# Patient Record
Sex: FEMALE | Race: WHITE | NOT HISPANIC OR LATINO | Employment: FULL TIME | URBAN - METROPOLITAN AREA
[De-identification: names, ages, dates, MRNs, and addresses within clinical notes are randomized per-mention and may not be internally consistent; named-entity substitution may affect disease eponyms.]

---

## 2021-03-01 ENCOUNTER — EVALUATION (OUTPATIENT)
Dept: PHYSICAL THERAPY | Facility: CLINIC | Age: 56
End: 2021-03-01
Payer: COMMERCIAL

## 2021-03-01 ENCOUNTER — APPOINTMENT (EMERGENCY)
Dept: RADIOLOGY | Facility: HOSPITAL | Age: 56
End: 2021-03-01
Payer: COMMERCIAL

## 2021-03-01 ENCOUNTER — HOSPITAL ENCOUNTER (EMERGENCY)
Facility: HOSPITAL | Age: 56
Discharge: HOME | End: 2021-03-01
Attending: EMERGENCY MEDICINE | Admitting: EMERGENCY MEDICINE
Payer: COMMERCIAL

## 2021-03-01 ENCOUNTER — OFFICE VISIT (OUTPATIENT)
Dept: URGENT CARE | Facility: CLINIC | Age: 56
End: 2021-03-01
Payer: COMMERCIAL

## 2021-03-01 ENCOUNTER — APPOINTMENT (OUTPATIENT)
Dept: RADIOLOGY | Facility: CLINIC | Age: 56
End: 2021-03-01
Payer: COMMERCIAL

## 2021-03-01 VITALS
WEIGHT: 185 LBS | HEIGHT: 63 IN | DIASTOLIC BLOOD PRESSURE: 84 MMHG | BODY MASS INDEX: 32.78 KG/M2 | TEMPERATURE: 97.5 F | SYSTOLIC BLOOD PRESSURE: 149 MMHG | OXYGEN SATURATION: 98 % | RESPIRATION RATE: 18 BRPM | HEART RATE: 115 BPM

## 2021-03-01 VITALS
DIASTOLIC BLOOD PRESSURE: 61 MMHG | HEART RATE: 85 BPM | HEIGHT: 63 IN | WEIGHT: 182.98 LBS | TEMPERATURE: 99.6 F | SYSTOLIC BLOOD PRESSURE: 124 MMHG | OXYGEN SATURATION: 92 % | BODY MASS INDEX: 32.42 KG/M2 | RESPIRATION RATE: 21 BRPM

## 2021-03-01 DIAGNOSIS — S73.005A HIP DISLOCATION, LEFT, INITIAL ENCOUNTER (HCC): Primary | ICD-10-CM

## 2021-03-01 DIAGNOSIS — M25.552 LEFT HIP PAIN: ICD-10-CM

## 2021-03-01 DIAGNOSIS — M25.552 LEFT HIP PAIN: Primary | ICD-10-CM

## 2021-03-01 DIAGNOSIS — T84.021A DISLOCATION OF INTERNAL LEFT HIP PROSTHESIS, INITIAL ENCOUNTER (HCC): ICD-10-CM

## 2021-03-01 DIAGNOSIS — Z96.642 STATUS POST TOTAL REPLACEMENT OF LEFT HIP: ICD-10-CM

## 2021-03-01 PROCEDURE — 99284 EMERGENCY DEPT VISIT MOD MDM: CPT

## 2021-03-01 PROCEDURE — 73501 X-RAY EXAM HIP UNI 1 VIEW: CPT

## 2021-03-01 PROCEDURE — 73502 X-RAY EXAM HIP UNI 2-3 VIEWS: CPT

## 2021-03-01 PROCEDURE — 99152 MOD SED SAME PHYS/QHP 5/>YRS: CPT | Performed by: EMERGENCY MEDICINE

## 2021-03-01 PROCEDURE — G0382 LEV 3 HOSP TYPE B ED VISIT: HCPCS | Performed by: EMERGENCY MEDICINE

## 2021-03-01 PROCEDURE — 96360 HYDRATION IV INFUSION INIT: CPT

## 2021-03-01 PROCEDURE — 27265 TREAT HIP DISLOCATION: CPT | Performed by: EMERGENCY MEDICINE

## 2021-03-01 PROCEDURE — 99285 EMERGENCY DEPT VISIT HI MDM: CPT | Performed by: EMERGENCY MEDICINE

## 2021-03-01 PROCEDURE — 97161 PT EVAL LOW COMPLEX 20 MIN: CPT | Performed by: PHYSICAL THERAPIST

## 2021-03-01 PROCEDURE — S9083 URGENT CARE CENTER GLOBAL: HCPCS | Performed by: EMERGENCY MEDICINE

## 2021-03-01 RX ORDER — CYCLOBENZAPRINE HCL 10 MG
10 TABLET ORAL 3 TIMES DAILY PRN
Qty: 20 TABLET | Refills: 0 | Status: SHIPPED | OUTPATIENT
Start: 2021-03-01

## 2021-03-01 RX ORDER — PROPOFOL 10 MG/ML
40 INJECTION, EMULSION INTRAVENOUS ONCE
Status: COMPLETED | OUTPATIENT
Start: 2021-03-01 | End: 2021-03-01

## 2021-03-01 RX ORDER — PREDNISONE 10 MG/1
TABLET ORAL
Qty: 27 TABLET | Refills: 0 | Status: SHIPPED | OUTPATIENT
Start: 2021-03-01

## 2021-03-01 RX ORDER — PROPOFOL 10 MG/ML
60 INJECTION, EMULSION INTRAVENOUS ONCE
Status: COMPLETED | OUTPATIENT
Start: 2021-03-01 | End: 2021-03-01

## 2021-03-01 RX ORDER — SODIUM CHLORIDE 9 MG/ML
125 INJECTION, SOLUTION INTRAVENOUS CONTINUOUS
Status: DISCONTINUED | OUTPATIENT
Start: 2021-03-01 | End: 2021-03-01 | Stop reason: HOSPADM

## 2021-03-01 RX ORDER — PROPOFOL 10 MG/ML
50 INJECTION, EMULSION INTRAVENOUS ONCE
Status: COMPLETED | OUTPATIENT
Start: 2021-03-01 | End: 2021-03-01

## 2021-03-01 RX ADMIN — PROPOFOL 50 MG: 10 INJECTION, EMULSION INTRAVENOUS at 13:23

## 2021-03-01 RX ADMIN — SODIUM CHLORIDE 125 ML/HR: 0.9 INJECTION, SOLUTION INTRAVENOUS at 13:07

## 2021-03-01 RX ADMIN — PROPOFOL 40 MG: 10 INJECTION, EMULSION INTRAVENOUS at 13:20

## 2021-03-01 RX ADMIN — PROPOFOL 60 MG: 10 INJECTION, EMULSION INTRAVENOUS at 13:16

## 2021-03-01 RX ADMIN — PROPOFOL 60 MG: 10 INJECTION, EMULSION INTRAVENOUS at 13:30

## 2021-03-01 NOTE — Clinical Note
Reno Cohen was seen and treated in our emergency department on 3/1/2021  No work until cleared by Family Doctor/Orthopedics        Diagnosis:     Maxi Gastelum    She may return on this date: If you have any questions or concerns, please don't hesitate to call        Christine Sandra DO    ______________________________           _______________          _______________  Hospital Representative                              Date                                Time

## 2021-03-01 NOTE — PROGRESS NOTES
[address]        NAME: Abrahan Albert is a 60 y o  female  : 1965    MRN: [id number]  DATE: 2021  TIME: 12:30 PM    Assessment and Plan   [facility] [[id number]]  1  Hip dislocation, left, initial encounter [facility])  Ambulatory referral to Physical Therapy    cyclobenzaprine (FLEXERIL) 10 mg tablet    predniSONE 10 mg tablet    XR hip/pelv 1 vw left if performed    Ambulatory Referral to Emergency Medicine    Transfer to other facility     Patient went to physical therapy as instructed however therapist was concerned about the degree of pain she was having and the limitation of motion that she was having therefore she advised x-ray of hip before she progressed with further maneuvers  X-ray revealed dislocated hip prosthesis on the left  I advised patient that further treatment would be needed in an ER  She had the left total hip replacement surgery done at [facility] but would prefer treatment at [facility] if possible  I discussed the case with ER doctor at [address] [place] [address] who agrees that patient could likely be treated there and would call it in ortho if she was unable to reduce the dislocation  Patient Instructions     Patient Instructions     Hip Dislocation   WHAT YOU NEED TO KNOW:   A hip dislocation occurs when your thigh bone is forced out of your hip socket  DISCHARGE INSTRUCTIONS:   Return to the emergency department if:   · You have severe pain  · You dislocate your hip again  Call your doctor if:   · You have a fever  · You have pain that does not go away after you take pain medicine  · You cannot walk well with your cane or crutches  · You have questions or concerns about your condition or care  Medicines: You may need the following:  · Prescription pain medicine  may be given  Ask your healthcare provider how to take this medicine safely   Some prescription pain medicines contain acetaminophen  Do not take other medicines that contain acetaminophen without talking to your healthcare provider  Too much acetaminophen may cause liver damage  Prescription pain medicine may cause constipation  Ask your healthcare provider how to prevent or treat constipation  · Take your medicine as directed  Contact your healthcare provider if you think your medicine is not helping or if you have side effects  Tell him of her if you are allergic to any medicine  Keep a list of the medicines, vitamins, and herbs you take  Include the amounts, and when and why you take them  Bring the list or the pill bottles to follow-up visits  Carry your medicine list with you in case of an emergency  Manage a hip dislocation: It will take 2 to 3 months for your hip to heal   · Use a walker or crutches as directed  Ask your healthcare provider or orthopedist when you can put weight on your injured side  As your hip heals, use a cane to help you walk until your limp goes away  · Avoid high-impact activities and sports  Do this for 6 to 12 weeks or until your hip strength has returned  · Go to physical therapy, if directed  A physical therapist teaches you exercises to increase the range of motion in your hip  Exercises also make your hip stronger and decrease pain  Prevent another hip dislocation:  Follow these precautions for 6 weeks after your injury or as directed:  · Sit with your back straight and your feet flat on the floor  Do not cross your legs  Do not lean forward when you sit in a chair  · Keep your knees apart  Place a pillow or wedge between your knees when you sit or lie  Do not twist your knees  Do not lift your knees higher than your hips  · Do not sit in a low chair  Use armrests and your upper body strength to push yourself up from a sitting position  · Do not bend at the waist to  an object from the floor    Bend your knees to reach the object, or use a tool to pick it up  Follow up with your doctor or orthopedist as directed: You may need another x-ray or CT scan  Write down your questions so you remember to ask them during your visits  © Copyright [address] Information is for End User's use only and may not be sold, redistributed or otherwise used for commercial purposes  All illustrations and images included in CareNotes® are the copyrighted property of A D A M , Inc  or [employer]   The above information is an  only  It is not intended as medical advice for individual conditions or treatments  Talk to your doctor, nurse or pharmacist before following any medical regimen to see if it is safe and effective for you  Hip Pain   WHAT YOU NEED TO KNOW:   Hip pain can be caused by a number of conditions, such as bursitis, arthritis, or muscle or tendon strain  You may have swelling in the fluid-filled sacs that protect your muscles and tendons  Hip pain can also be caused by a lower back problem  Hip pain may be caused by trauma, playing sports, or running  Pain may start in your hip and go to your thigh, buttock, or groin  DISCHARGE INSTRUCTIONS:   Medicines:   · NSAIDs , such as ibuprofen, help decrease swelling, pain, and fever  This medicine is available with or without a doctor's order  NSAIDs can cause stomach bleeding or kidney problems in certain people  If you take blood thinner medicine, always ask your healthcare provider if NSAIDs are safe for you  Always read the medicine label and follow directions  · Take your medicine as directed  Contact your healthcare provider if you think your medicine is not helping or if you have side effects  Tell him of her if you are allergic to any medicine  Keep a list of the medicines, vitamins, and herbs you take  Include the amounts, and when and why you take them  Bring the list or the pill bottles to follow-up visits   Carry your medicine list with you in case of an emergency  Return to the emergency department if:   · Your pain gets worse  · You have numbness in your leg or toes  · You cannot put any weight on or move your hip  Contact your healthcare provider if:   · You have a fever  · Your pain does not decrease, even after treatment  · You have questions or concerns about your condition or care  Follow up with your healthcare provider as directed: You may need physical therapy, an injection, or more testing  You may need to see an orthopedic specialist  Write down your questions so you remember to ask them during your visits  Manage your hip pain:   · Rest  your injured hip so that it can heal  You may need to avoid putting any weight on your hip for at least 48 hours  Return to normal activities as directed  · Ice  the injury for 20 minutes every 4 hours, or as directed  Use an ice pack, or put crushed ice in a plastic bag  Cover it with a towel to protect your skin  Ice helps prevent tissue damage and decreases swelling and pain  · Elevate  your injured hip above the level of your heart as often as you can  This will help decrease swelling and pain  If possible, prop your hip and leg on pillows or blankets to keep the area elevated comfortably  · Maintain a healthy weight  Extra body weight can cause pressure and pain in your hip, knee, and ankle joints  Ask your healthcare provider how much you should weigh  Ask him or her to help you create a weight loss plan if you are overweight  · Use assistive devices as directed  You may need to use a cane or crutches  Assistive devices help decrease pain and pressure on your hip when you walk  Ask your healthcare provider for more information about assistive devices and how to use them correctly  © Copyright [address] Information is for End User's use only and may not be sold, redistributed or otherwise used for commercial purposes   All illustrations and images included in CareNotes® are the copyrighted property of A D A M , Inc  or [facility] [employer]   The above information is an  only  It is not intended as medical advice for individual conditions or treatments  Talk to your doctor, nurse or pharmacist before following any medical regimen to see if it is safe and effective for you  Piriformis Syndrome   WHAT YOU NEED TO KNOW:   Piriformis syndrome is sciatic nerve pain caused by an injured or overused piriformis muscle  This is a muscle inside your buttocks that helps you move your leg  The pain is caused when this muscle pinches your sciatic nerve  You may feel the pain in your hip or down your leg  DISCHARGE INSTRUCTIONS:   Medicines: You may need any of the following:  · Prescription medicines  may be used to relax your muscles and decrease pain and swelling  · NSAIDs  help decrease swelling and pain  This medicine is available with or without a doctor's order  NSAIDs can cause stomach bleeding or kidney problems in certain people  If you take blood thinner medicine, always ask your healthcare provider if NSAIDs are safe for you  Always read the medicine label and follow directions  · Acetaminophen  decreases pain  It is available without a doctor's order  Ask how much to take and how often to take it  Follow directions  Acetaminophen can cause liver damage if not taken correctly  · Take your medicine as directed  Contact your healthcare provider if you think your medicine is not helping or if you have side effects  Tell him or her if you are allergic to any medicine  Keep a list of the medicines, vitamins, and herbs you take  Include the amounts, and when and why you take them  Bring the list or the pill bottles to follow-up visits  Carry your medicine list with you in case of an emergency  Follow up with your healthcare provider as directed: You may need to return for more tests   You may also be referred to a physical therapist  Write down your questions so you remember to ask them during your visits  Manage your symptoms of piriformis syndrome:   · Rest as directed  Avoid activities that make your pain worse  · Apply ice to the buttock on your injured side  Use an ice pack, or put crushed ice in a plastic bag  Cover it with a towel  Leave the ice on for 15 to 20 minutes every hour, or as directed  Ice helps prevent tissue damage and decreases swelling and pain  · Apply heat to the buttock on your injured side  Use heating pads for 20 to 30 minutes every 2 hours for as many days as directed  Heat helps decrease pain and muscle spasms  · Stretch as directed  Lie on your back with your knees bent  Place the ankle of your injured leg on the knee of your other leg  Gently pull your bent leg toward your chest, until you feel a stretch in the buttock of your injured leg  A physical therapist may show you other exercises to stretch and strengthen your hip muscles  Contact your healthcare provider if:   · Your pain worsens or returns, even with treatment  · You have questions or concerns about your condition or care  Return to the emergency department if:   · You cannot move your leg or foot  © Copyright [address] Information is for End User's use only and may not be sold, redistributed or otherwise used for commercial purposes  All illustrations and images included in CareNotes® are the copyrighted property of A D A M , Inc  or [address] [place] [address]  The above information is an  only  It is not intended as medical advice for individual conditions or treatments  Talk to your doctor, nurse or pharmacist before following any medical regimen to see if it is safe and effective for you  Follow up with PCP in 3-5 days  Proceed to  ER if symptoms worsen      Chief Complaint     Chief Complaint   Patient presents with    thigh pain     pain and tightness in buttocks and thigh on left side, symptoms started this morning after bending down and reaching over to left side, pt states had hip replacemnet in 2011          History of Present Illness       Patient complains sudden onset of left hip pain radiating to left thigh while bending down to pick something up earlier today  She had total hip replacement on the left 10 years ago without complications  She noted increasing left hip pain and tightness over the past few months and she saw her orthopedic surgeon for this problem 5 weeks ago  He did an x-ray and told her that x-ray showed no new problems and he gave her a "steroid injection" into her left hip followed by partial symptomatic relief  Review of Systems   Review of Systems   Constitutional: Negative for activity change  Gastrointestinal: Negative for abdominal pain  Musculoskeletal: Positive for arthralgias  Negative for back pain, joint swelling, myalgias, neck pain and neck stiffness  Skin: Negative for color change and wound  Neurological: Negative for dizziness, syncope, weakness, light-headedness and headaches           Current Medications       Current Outpatient Medications:     cyclobenzaprine (FLEXERIL) 10 mg tablet, Take 1 tablet (10 mg total) by mouth 3 (three) times a day as needed for muscle spasms, Disp: 20 tablet, Rfl: 0    predniSONE 10 mg tablet, Take once daily all days pills on this schedule 6- 6- 5- 4- 3- 2- 1, Disp: 27 tablet, Rfl: 0    Current Allergies     Allergies as of 03/01/2021 - Reviewed 03/01/2021   Allergen Reaction Noted    Bactrim [sulfamethoxazole-trimethoprim] Hives 03/01/2021            The following portions of the patient's history were reviewed and updated as appropriate: allergies, current medications, past family history, past medical history, past social history, past surgical history and problem list      Past Medical History:   Diagnosis Date    Known health problems: none        Past Surgical History:   Procedure Laterality Date    APPENDECTOMY       SECTION      TOTAL HIP ARTHROPLASTY         History reviewed  No pertinent family history  Medications have been verified  Objective   /84   Pulse (!) 115   Temp 97 5 °F (36 4 °C)   Resp 18   Ht 5' 3" (1 6 m)   Wt 83 9 kg (185 lb)   SpO2 98%   BMI 32 77 kg/m²        Physical Exam     Physical Exam  Vitals signs and nursing note reviewed  Constitutional:       Appearance: She is well-developed  HENT:      Head: Normocephalic and atraumatic  Eyes:      Conjunctiva/sclera: Conjunctivae normal       Pupils: Pupils are equal, round, and reactive to light  Neck:      Musculoskeletal: Normal range of motion and neck supple  Musculoskeletal:         General: Tenderness present  Comments: Tender left buttocks at piriformis muscle and trochanteric bursa  Patient had extreme difficulty getting onto same table therefore she was not put through range of motion testing for her hip  Skin:     General: Skin is warm and dry  Findings: No rash  Neurological:      Mental Status: She is alert and oriented to person, place, and time  Deep Tendon Reflexes: Reflexes normal    Psychiatric:         Behavior: Behavior normal          Thought Content:  Thought content normal          Judgment: Judgment normal

## 2021-03-01 NOTE — PROGRESS NOTES
PT Evaluation     Today's date: 3/1/2021  Patient name: Demian Sauer  : 1965  MRN: [id number]  Referring provider: Peace Rosales MD  Dx:   Encounter Diagnosis     ICD-10-CM    1  Left hip pain  M25 552                   Assessment  Assessment details: Demian Sauer is a 60 y o  female who presents with complaints of acute L hip pain  Further referral to obtain an X-ray appears necessary at this time based upon examination results  Pt does have a history of L PAVITHRA in   She states that she reached across her body (to the L) to pick something off the ground this morning and has immediate pain in her groin and posterior hip  She was unable to put full weight through her L side and was ambulating with a rolling walker  Due to AVIS and current presentation, PT discontinued evaluation due to suspicion of dislocation of L hip  Pt was referred to Urgent care to obtain an x-ray  Impairments: abnormal gait, activity intolerance, impaired physical strength, lacks appropriate home exercise program, pain with function and weight-bearing intolerance    Symptom irritability: moderate  Plan  Plan details: Pt referred for further imaging of L hip at this time  Treatment plan discussed with: patient        Subjective Evaluation    History of Present Illness  Mechanism of injury: Pt had PAVITHRA in  and went to therapy following that  She reports no difficulty or pain following for the last 10 years  She notes that ~ 3 months ago she started to get pain in her L hip and into her buttocks out of the blue  She notes that she went back to her surgeon in December, took x-rays and everything looked good  She did get a shot that provided relief  Pt saw the urgent care this morning, they referred her to PT to see if that would help  Did not do x-rays this morning  She states that reaching down to pick something up or going across her body to the L causes tightness in her groin and lateral L hip   Pt notes that she does not feel like her hip is out of place, but its very tight  Pt notes that she did have tingling into her toes on her L side this morning  Pt is ambulating with a walking right now due to pain, she states that she usually does not use anything to ambulate  She cannot put full pressure on her L side currently and can barely more her L leg  Pt does clerical work, sitting for most of the day  Quality of life: excellent    Pain  Current pain ratin  At best pain ratin  At worst pain ratin  Location: L groin area and lateral hip, buttock area on L  Quality: tight and pulling  Relieving factors: ice  Aggravating factors: standing, walking and stair climbing    Social Support  Steps to enter house: yes (1 step)  Lives in: [facility]      Diagnostic Tests  X-ray: normal  Patient Goals  Patient goals for therapy: decreased pain, increased motion, return to work and independence with ADLs/IADLs          Objective     Neurological Testing     Sensation     Hip   Left Hip   Intact: light touch    Right Hip   Intact: light touch    General Comments:      Hip Comments   Unable to actively flex, abd, add  L hip  Minimal motion in internal rotation/external rotation  Pain with all motions  Minimal strength in L hip, pain in all planes  Pt was not able to lay supine on examination table due to pain           Precautions: H/O PAVITHRA      Daily Treatment Diary:      Initial Evaluation Date: 21  Compliance 3/1/21                     Visit Number 1                    Reyna-Ahsan  IE                 [address] [place]                           3/1/21                     Manual                      Piriformis STM                      L hip ROM                                            Ther-Ex Neuro Re-Ed                                                                                                Ther-Act                                                               Modalities

## 2021-03-01 NOTE — ED PROVIDER NOTES
History  Chief Complaint   Patient presents with    Dislocation     pt presented to this ED per left prosthetic hip dislocation after bending over this morning at 0700 and felt a "pop"  Patient is a 12-year-old female presenting to the emergency department from urgent care secondary to left hip prosthetic dislocation, patient reports around 7:00 a m  This morning she was twisting to the left side and felt a pop, since then she has had pain in the left hip and has been unable to bear weight, she had x-rays at urgent care and was promptly sent to the emergency department after hip dislocation was noted, she denies pain or injury elsewhere, she had the hip replacement 10 years ago, has not had a previous dislocation, she has however been having some discomfort in the left hip over the past few months, denies any numbness or tingling          Prior to Admission Medications   Prescriptions Last Dose Informant Patient Reported? Taking? cyclobenzaprine (FLEXERIL) 10 mg tablet   No No   Sig: Take 1 tablet (10 mg total) by mouth 3 (three) times a day as needed for muscle spasms   predniSONE 10 mg tablet   No No   Sig: Take once daily all days pills on this schedule 6- 6- 5- 4- 3- 2- 1      Facility-Administered Medications: None       Past Medical History:   Diagnosis Date    Known health problems: none        Past Surgical History:   Procedure Laterality Date    APPENDECTOMY       SECTION      TOTAL HIP ARTHROPLASTY         History reviewed  No pertinent family history  I have reviewed and agree with the history as documented  E-Cigarette/Vaping    E-Cigarette Use Never User      E-Cigarette/Vaping Substances     Social History     Tobacco Use    Smoking status: Never Smoker    Smokeless tobacco: Never Used   Substance Use Topics    Alcohol use: Yes     Frequency: Monthly or less    Drug use: Never       Review of Systems   Constitutional: Negative  HENT: Negative  Eyes: Negative  Respiratory: Negative  Cardiovascular: Negative  Gastrointestinal: Negative  Endocrine: Negative  Genitourinary: Negative  Musculoskeletal: Positive for arthralgias (Left hip pain)  Skin: Negative  Allergic/Immunologic: Negative  Neurological: Negative  Hematological: Negative  Psychiatric/Behavioral: Negative  Physical Exam  Physical Exam  Constitutional:       Appearance: She is well-developed  HENT:      Head: Normocephalic and atraumatic  Eyes:      Conjunctiva/sclera: Conjunctivae normal       Pupils: Pupils are equal, round, and reactive to light  Neck:      Musculoskeletal: Normal range of motion and neck supple  Cardiovascular:      Rate and Rhythm: Normal rate  Pulmonary:      Effort: Pulmonary effort is normal    Abdominal:      Palpations: Abdomen is soft  Musculoskeletal:      Comments: Tenderness to palpate over the left hip joints, decreased range of motion, distal sensation and motor is intact with 2+ DP pulses   Skin:     General: Skin is warm and dry  Neurological:      Mental Status: She is alert and oriented to person, place, and time           Vital Signs  ED Triage Vitals [03/01/21 1301]   Temperature Pulse Respirations Blood Pressure SpO2   99 6 °F (37 6 °C) (!) 122 18 157/94 99 %      Temp src Heart Rate Source Patient Position - Orthostatic VS BP Location FiO2 (%)   -- Monitor Lying Left arm --      Pain Score       7           Vitals:    03/01/21 1345 03/01/21 1350 03/01/21 1355 03/01/21 1400   BP: 116/57 126/62 125/58 124/61   Pulse: 86 90 91 85   Patient Position - Orthostatic VS: Lying Lying Lying Lying               ED Medications  Medications   sodium chloride 0 9 % infusion (0 mL/hr Intravenous Stopped 3/1/21 1425)   propofol (DIPRIVAN) 200 MG/20ML bolus injection 60 mg (60 mg Intravenous Given 3/1/21 1316)   propofol (DIPRIVAN) 200 MG/20ML bolus injection 40 mg (40 mg Intravenous Given 3/1/21 1320)   propofol (DIPRIVAN) 200 MG/20ML bolus injection 50 mg (60 mg Intravenous Given 3/1/21 1330)   propofol (DIPRIVAN) 200 MG/20ML bolus injection 50 mg (50 mg Intravenous Given 3/1/21 1323)       Diagnostic Studies  Results Reviewed     None                 XR hip/pelv 2-3 vws left if performed   Final Result by Henry Arevalo MD (03/01 1349)   Anatomic relocation of prosthetic left hip      No acute osseous abnormality              Workstation performed: [id number]         XR hip/pelv 1 vw left if performed   ED Interpretation by Alex Obrien DO (03/01 1345)   Successful reduction      Final Result by Henry Arevalo MD (03/01 1347)      Persistent superior dislocation of prosthetic left femoral head            Workstation performed: [id number]                    Procedures  Procedural Sedation    Date/Time: 3/1/2021 1:39 PM  Performed by: Alex Obrien DO  Authorized by: Alex Obrien DO     Immediate pre-procedure details:     Reassessment: Patient reassessed immediately prior to procedure      Reviewed: vital signs, relevant labs/tests and NPO status      Verified: bag valve mask available, emergency equipment available, intubation equipment available, IV patency confirmed, oxygen available, reversal medications available and suction available    Procedure details (see MAR for exact dosages):     Sedation start time:  3/1/2021 1:16 PM    Preoxygenation:  Nasal cannula    Sedation:  Propofol    Intra-procedure monitoring:  Blood pressure monitoring, cardiac monitor, continuous pulse oximetry, frequent LOC assessments and frequent vital sign checks    Intra-procedure events: none      Intra-procedure management:  Supplemental oxygen    Sedation end time:  3/1/2021 1:46 PM    Total sedation time (minutes):  30  Post-procedure details:     Post-sedation assessment completed:  3/1/2021 1:41 PM    Attendance: Constant attendance by certified staff until patient recovered      Recovery: Patient returned to pre-procedure baseline Post-sedation assessments completed and reviewed: airway patency, cardiovascular function, hydration status, mental status, nausea/vomiting, pain level, respiratory function and temperature      Patient is stable for discharge or admission: yes      Patient tolerance: Tolerated well, no immediate complications  Pre-Procedural Sedation  Performed by: Bandar Bolanos DO  Authorized by: Bandar Bolanos DO     Consent:     Consent obtained:  Written    Consent given by:  Patient    Risks discussed: Allergic reaction, dysrhythmia, inadequate sedation, nausea, prolonged hypoxia resulting in organ damage, prolonged sedation necessitating reversal, respiratory compromise necessitating ventilatory assistance and intubation and vomiting    Alternatives discussed:  Analgesia without sedation, anxiolysis and regional anesthesia  Brohard protocol:     Procedure explained and questions answered to patient or proxy's satisfaction: yes      Relevant documents present and verified: yes      Test results available and properly labeled: yes      Radiology Images displayed and confirmed    If images not available, report reviewed: yes      Required blood products, implants, devices, and special equipment available: yes      Site/side marked: yes      Immediately prior to procedure a time out was called: yes      Patient identity confirmation method:  Verbally with patient and arm band  Indications:     Sedation purpose:  Dislocation reduction    Procedure necessitating sedation performed by:  Different physician    Intended level of sedation:  Moderate (conscious sedation)  Pre-sedation assessment:     NPO status caution: urgency dictates proceeding with non-ideal NPO status      Neck mobility: normal      Mouth opening:  3 or more finger widths    Thyromental distance:  3 finger widths    Mallampati score:  I - soft palate, uvula, fauces, pillars visible    Pre-sedation assessments completed and reviewed: airway patency, cardiovascular function, hydration status, mental status, nausea/vomiting, pain level, respiratory function and temperature      History of difficult intubation: no      Pre-sedation assessment completed:  3/1/2021 1:10 PM  Orthopedic injury treatment    Date/Time: 3/1/2021 1:41 PM  Performed by: Paris Ballard DO  Authorized by: Paris Ballard DO     Patient Location:  ED  Other Assisting Provider: Yes (comment) (Dr Jorge Akbar and Dr Paradise Carlos)    Written consent obtained?: Yes    Risks and benefits: Risks, benefits and alternatives were discussed    Consent given by:  Patient  Patient states understanding of procedure being performed: Yes    Patient's understanding of procedure matches consent: Yes    Procedure consent matches procedure scheduled: Yes    Relevant documents present and verified: Yes    Test results available and properly labeled: Yes    Site marked: Yes    Radiology Images displayed and confirmed  If images not available, report reviewed: Yes    Required items: Required blood products, implants, devices and special equipment available    Patient identity confirmed:  Verbally with patient and arm band  Time out: Immediately prior to the procedure a time out was called    Injury location:  Hip  Location details:  Left hip  Injury type:  Dislocation  Dislocation type: posterior    Spontaneous?: Yes    Prosthesis?: Yes    Neurovascular status: Neurovascularly intact    Distal perfusion: normal    Neurological function: normal    Range of motion: normal    Local anesthesia used?: No    General anesthesia used?: No    Sedation type:   Moderate (conscious) sedation (See separate Procedural Sedation form)  Manipulation performed?: Yes    Reduction method:  Traction and counter traction  Reduction method:  Traction and counter traction  Reduction method:  Traction and counter traction  Reduction method:  Traction and counter traction  Reduction method:  Traction and counter traction  Reduction method: Traction and counter traction  Skeletal traction used?: Yes    Reduction successful?: Yes    Confirmation: Reduction confirmed by x-ray    Immobilization:  Knee immobilizer (static)  Neurovascular status: Neurovascularly intact    Distal perfusion: normal    Neurological function: normal    Range of motion: normal    Patient tolerance:  Patient tolerated the procedure well with no immediate complications      Conscious Sedation Assessment      Classification Score   ASA Scale Assessment  1-Healthy patient, no disease outside surgical process filed at 03/01/2021 1314             ED Course  ED Course as of Mar 01 1455   Mon Mar 01, 2021   8624 Pre and post dislocation x-rays reviewed and provided to on-call orthopedist, Dr Nohelia Welsh, but recommends patient be placed in a long knee immobilizer, given hip dislocation precautions and follow-up in the office as an outpatient within the next week, plan was discussed with patient, she was placed in a knee immobilizer, provided with hip dislocation precautions, advised to take Tylenol or Motrin as needed and follow-up with orthopedics within the next week, she was provided with contact information for local orthopedists, advised to return if any additional concerns, patient and daughter at bedside acknowledge understanding and agreement with this plan      [phone] Patient awake and alert, tolerating p o   Challenge, given follow-up instructions and instructions for return, also given hip dislocation precautions, patient advised to return if symptoms worsen or any additional concerns, patient acknowledges understanding and agreement with this plan                                                Disposition  Final diagnoses:   Hip dislocation, left, initial encounter [facility])     Time reflects when diagnosis was documented in both MDM as applicable and the Disposition within this note     Time User Action Codes Description Comment    3/1/2021  1:51 PM Samuel Black Add [[id number]] Hip dislocation, left, initial encounter [facility])       ED Disposition     ED Disposition Condition Date/Time Comment    Discharge Stable Mon Mar 1, 2021  1:50 PM Eboni Milner discharge to home/self care              Follow-up Information     Follow up With Specialties Details Why Contact Info    Pierre Opitz, DO Orthopedic Surgery Schedule an appointment as soon as possible for a visit   [address] [place] [id number]  [address] [facility] Orthopedic Surgery Schedule an appointment as soon as possible for a visit   [phone]  [phone]            Discharge Medication List as of 3/1/2021  1:53 PM      CONTINUE these medications which have NOT CHANGED    Details   cyclobenzaprine (FLEXERIL) 10 mg tablet Take 1 tablet (10 mg total) by mouth 3 (three) times a day as needed for muscle spasms, Starting Mon 3/1/2021, Normal      predniSONE 10 mg tablet Take once daily all days pills on this schedule 6- 6- 5- 4- 3- 2- 1, Normal               PDMP Review     None          ED Provider  Electronically Signed by           Da Jimenez DO  03/01/21 3148

## 2021-03-01 NOTE — PATIENT INSTRUCTIONS
Hip Dislocation   WHAT YOU NEED TO KNOW:   A hip dislocation occurs when your thigh bone is forced out of your hip socket  DISCHARGE INSTRUCTIONS:   Return to the emergency department if:   · You have severe pain  · You dislocate your hip again  Call your doctor if:   · You have a fever  · You have pain that does not go away after you take pain medicine  · You cannot walk well with your cane or crutches  · You have questions or concerns about your condition or care  Medicines: You may need the following:  · Prescription pain medicine  may be given  Ask your healthcare provider how to take this medicine safely  Some prescription pain medicines contain acetaminophen  Do not take other medicines that contain acetaminophen without talking to your healthcare provider  Too much acetaminophen may cause liver damage  Prescription pain medicine may cause constipation  Ask your healthcare provider how to prevent or treat constipation  · Take your medicine as directed  Contact your healthcare provider if you think your medicine is not helping or if you have side effects  Tell him of her if you are allergic to any medicine  Keep a list of the medicines, vitamins, and herbs you take  Include the amounts, and when and why you take them  Bring the list or the pill bottles to follow-up visits  Carry your medicine list with you in case of an emergency  Manage a hip dislocation: It will take 2 to 3 months for your hip to heal   · Use a walker or crutches as directed  Ask your healthcare provider or orthopedist when you can put weight on your injured side  As your hip heals, use a cane to help you walk until your limp goes away  · Avoid high-impact activities and sports  Do this for 6 to 12 weeks or until your hip strength has returned  · Go to physical therapy, if directed  A physical therapist teaches you exercises to increase the range of motion in your hip   Exercises also make your hip stronger and decrease pain  Prevent another hip dislocation:  Follow these precautions for 6 weeks after your injury or as directed:  · Sit with your back straight and your feet flat on the floor  Do not cross your legs  Do not lean forward when you sit in a chair  · Keep your knees apart  Place a pillow or wedge between your knees when you sit or lie  Do not twist your knees  Do not lift your knees higher than your hips  · Do not sit in a low chair  Use armrests and your upper body strength to push yourself up from a sitting position  · Do not bend at the waist to  an object from the floor  Bend your knees to reach the object, or use a tool to pick it up  Follow up with your doctor or orthopedist as directed: You may need another x-ray or CT scan  Write down your questions so you remember to ask them during your visits  © Copyright [address] [place] [address] Drive Information is for End User's use only and may not be sold, redistributed or otherwise used for commercial purposes  All illustrations and images included in CareNotes® are the copyrighted property of A D A M , Inc  or [address] [place], [place] [address]kimi   The above information is an  only  It is not intended as medical advice for individual conditions or treatments  Talk to your doctor, nurse or pharmacist before following any medical regimen to see if it is safe and effective for you  Hip Pain   WHAT YOU NEED TO KNOW:   Hip pain can be caused by a number of conditions, such as bursitis, arthritis, or muscle or tendon strain  You may have swelling in the fluid-filled sacs that protect your muscles and tendons  Hip pain can also be caused by a lower back problem  Hip pain may be caused by trauma, playing sports, or running  Pain may start in your hip and go to your thigh, buttock, or groin  DISCHARGE INSTRUCTIONS:   Medicines:   · NSAIDs , such as ibuprofen, help decrease swelling, pain, and fever   This medicine is available with or without a doctor's order  NSAIDs can cause stomach bleeding or kidney problems in certain people  If you take blood thinner medicine, always ask your healthcare provider if NSAIDs are safe for you  Always read the medicine label and follow directions  · Take your medicine as directed  Contact your healthcare provider if you think your medicine is not helping or if you have side effects  Tell him of her if you are allergic to any medicine  Keep a list of the medicines, vitamins, and herbs you take  Include the amounts, and when and why you take them  Bring the list or the pill bottles to follow-up visits  Carry your medicine list with you in case of an emergency  Return to the emergency department if:   · Your pain gets worse  · You have numbness in your leg or toes  · You cannot put any weight on or move your hip  Contact your healthcare provider if:   · You have a fever  · Your pain does not decrease, even after treatment  · You have questions or concerns about your condition or care  Follow up with your healthcare provider as directed: You may need physical therapy, an injection, or more testing  You may need to see an orthopedic specialist  Write down your questions so you remember to ask them during your visits  Manage your hip pain:   · Rest  your injured hip so that it can heal  You may need to avoid putting any weight on your hip for at least 48 hours  Return to normal activities as directed  · Ice  the injury for 20 minutes every 4 hours, or as directed  Use an ice pack, or put crushed ice in a plastic bag  Cover it with a towel to protect your skin  Ice helps prevent tissue damage and decreases swelling and pain  · Elevate  your injured hip above the level of your heart as often as you can  This will help decrease swelling and pain  If possible, prop your hip and leg on pillows or blankets to keep the area elevated comfortably  · Maintain a healthy weight    Extra body weight can cause pressure and pain in your hip, knee, and ankle joints  Ask your healthcare provider how much you should weigh  Ask him or her to help you create a weight loss plan if you are overweight  · Use assistive devices as directed  You may need to use a cane or crutches  Assistive devices help decrease pain and pressure on your hip when you walk  Ask your healthcare provider for more information about assistive devices and how to use them correctly  © Copyright [address] Information is for End User's use only and may not be sold, redistributed or otherwise used for commercial purposes  All illustrations and images included in CareNotes® are the copyrighted property of A D A M , Inc  or [address] [place] [address]  The above information is an  only  It is not intended as medical advice for individual conditions or treatments  Talk to your doctor, nurse or pharmacist before following any medical regimen to see if it is safe and effective for you  Piriformis Syndrome   WHAT YOU NEED TO KNOW:   Piriformis syndrome is sciatic nerve pain caused by an injured or overused piriformis muscle  This is a muscle inside your buttocks that helps you move your leg  The pain is caused when this muscle pinches your sciatic nerve  You may feel the pain in your hip or down your leg  DISCHARGE INSTRUCTIONS:   Medicines: You may need any of the following:  · Prescription medicines  may be used to relax your muscles and decrease pain and swelling  · NSAIDs  help decrease swelling and pain  This medicine is available with or without a doctor's order  NSAIDs can cause stomach bleeding or kidney problems in certain people  If you take blood thinner medicine, always ask your healthcare provider if NSAIDs are safe for you  Always read the medicine label and follow directions  · Acetaminophen  decreases pain  It is available without a doctor's order  Ask how much to take and how often to take it  Follow directions  Acetaminophen can cause liver damage if not taken correctly  · Take your medicine as directed  Contact your healthcare provider if you think your medicine is not helping or if you have side effects  Tell him or her if you are allergic to any medicine  Keep a list of the medicines, vitamins, and herbs you take  Include the amounts, and when and why you take them  Bring the list or the pill bottles to follow-up visits  Carry your medicine list with you in case of an emergency  Follow up with your healthcare provider as directed: You may need to return for more tests  You may also be referred to a physical therapist  Write down your questions so you remember to ask them during your visits  Manage your symptoms of piriformis syndrome:   · Rest as directed  Avoid activities that make your pain worse  · Apply ice to the buttock on your injured side  Use an ice pack, or put crushed ice in a plastic bag  Cover it with a towel  Leave the ice on for 15 to 20 minutes every hour, or as directed  Ice helps prevent tissue damage and decreases swelling and pain  · Apply heat to the buttock on your injured side  Use heating pads for 20 to 30 minutes every 2 hours for as many days as directed  Heat helps decrease pain and muscle spasms  · Stretch as directed  Lie on your back with your knees bent  Place the ankle of your injured leg on the knee of your other leg  Gently pull your bent leg toward your chest, until you feel a stretch in the buttock of your injured leg  A physical therapist may show you other exercises to stretch and strengthen your hip muscles  Contact your healthcare provider if:   · Your pain worsens or returns, even with treatment  · You have questions or concerns about your condition or care  Return to the emergency department if:   · You cannot move your leg or foot        © Copyright [address] Information is for End User's use only and may not be sold, redistributed or otherwise used for commercial purposes  All illustrations and images included in CareNotes® are the copyrighted property of A D A M , Inc  or Davey Vega  The above information is an  only  It is not intended as medical advice for individual conditions or treatments  Talk to your doctor, nurse or pharmacist before following any medical regimen to see if it is safe and effective for you

## 2021-03-01 NOTE — SEDATION DOCUMENTATION
Post reduction xray placement performed, pt   Left hip not properly in place, 2nd attempt to be performed

## 2021-03-04 ENCOUNTER — OFFICE VISIT (OUTPATIENT)
Dept: OBGYN CLINIC | Facility: CLINIC | Age: 56
End: 2021-03-04
Payer: COMMERCIAL

## 2021-03-04 VITALS
TEMPERATURE: 97.9 F | SYSTOLIC BLOOD PRESSURE: 148 MMHG | HEIGHT: 63 IN | DIASTOLIC BLOOD PRESSURE: 84 MMHG | WEIGHT: 182 LBS | BODY MASS INDEX: 32.25 KG/M2 | HEART RATE: 98 BPM

## 2021-03-04 DIAGNOSIS — T84.021A DISLOCATION OF INTERNAL LEFT HIP PROSTHESIS, INITIAL ENCOUNTER (HCC): Primary | ICD-10-CM

## 2021-03-04 DIAGNOSIS — Z96.642 STATUS POST TOTAL REPLACEMENT OF LEFT HIP: ICD-10-CM

## 2021-03-04 PROCEDURE — 99204 OFFICE O/P NEW MOD 45 MIN: CPT | Performed by: ORTHOPAEDIC SURGERY

## 2021-03-04 NOTE — PATIENT INSTRUCTIONS
Be sure to maintain Hip Precautions (no bending at waist, no crossing legs, or internally rotating surgical leg) at all times!

## 2021-03-04 NOTE — LETTER
March 4, 2021     Patient: Daisy Diazjesus   YOB: 1965   Date of Visit: 3/4/2021       To Whom it May Concern:    Daisy Last is under my professional care  She was seen in my office on 3/4/2021  She   May return to work for her next scheduled shift without restrictions  If you have any questions or concerns, please don't hesitate to call           Sincerely,          Angelique Warner        CC: Daisy Last

## 2021-03-04 NOTE — PROGRESS NOTES
ASSESSMENT/PLAN:    Diagnoses and all orders for this visit:    Dislocation of internal left hip prosthesis, initial encounter [facility])  -     Ambulatory referral to Orthopedic Surgery  -     Ambulatory referral to Physical Therapy; Future    Status post total replacement of left hip  -     Ambulatory referral to Physical Therapy; Future         The patient may discontinue the knee immobilizer and crutches  She was provided a referral to physical therapy to begin working on strengthening of the left hip stabilizers  She was also provided a note for work allowing her to return to full duty  We will see her back in 4 weeks for recheck  She was encouraged to contact us should questions or concerns arise  She was encouraged to continue posterior hip precautions at least for the next several weeks  Return in about 4 weeks (around 4/1/2021)  _____________________________________________________  CHIEF COMPLAINT:  Chief Complaint   Patient presents with    Left Hip - Dislocation         SUBJECTIVE:  Leon Bradford is a 60 y o  female who presents For evaluation request status post left hip dislocation  The patient reports on 03/01/2021 she was standing and reaching across her body to the left pick something off the floor when she began experiencing immediate pain in her left hip  She presented to the urgent care shortly after the injury where they referred her to physical therapy without performing any x-rays  She was seen by the physical therapist the same day who encouraged her to go to the ED  She then presented to the ED where x-rays were obtained and she was diagnosed with a left hip dislocation  This was reduced without difficulty  She reports she is status post left total hip arthroplasty performed by Dr Arreguin of [facility] in 2011  She reports an uncomplicated recovery from this procedure    She states in December 2020 she did present to his office for evaluation because she was experiencing popping in this left hip  He told her that she likely had a tight muscle and showed her home exercises to be done regularly  Since her hip dislocation on , the patient reports she has been doing well  She denies any significant pain  She is apprehensive about repeat dislocation  She has been weight-bearing with a knee immobilizer and crutches as provided by the ED  She reports that her left lower extremity does feel weak in comparison to her right side since the dislocation occurred  She denies numbness or tingling  PAST MEDICAL HISTORY:  Past Medical History:   Diagnosis Date    Known health problems: none        PAST SURGICAL HISTORY:  Past Surgical History:   Procedure Laterality Date    APPENDECTOMY       SECTION      TOTAL HIP ARTHROPLASTY         FAMILY HISTORY:  Family History   Problem Relation Age of Onset    Ovarian cancer Mother     Heart failure Father        SOCIAL HISTORY:  Social History     Tobacco Use    Smoking status: Former Smoker     Types: Cigarettes     Quit date: 3/4/2016     Years since quittin 0    Smokeless tobacco: Never Used   Substance Use Topics    Alcohol use: Yes     Frequency: Monthly or less     Comment: rare    Drug use: Never       MEDICATIONS:    Current Outpatient Medications:     predniSONE 10 mg tablet, Take once daily all days pills on this schedule 6- 6- 5- 4- 3- 2- 1, Disp: 27 tablet, Rfl: 0    cyclobenzaprine (FLEXERIL) 10 mg tablet, Take 1 tablet (10 mg total) by mouth 3 (three) times a day as needed for muscle spasms (Patient not taking: Reported on 3/4/2021), Disp: 20 tablet, Rfl: 0    ALLERGIES:  Allergies   Allergen Reactions    Bactrim [Sulfamethoxazole-Trimethoprim] Hives       Review of systems:   Constitutional: Negative for fatigue, fever or loss of apetite  HENT: Negative  Respiratory: Negative for shortness of breath, dyspnea  Cardiovascular: Negative for chest pain/tightness     Gastrointestinal: Negative for abdominal pain, N/V  Endocrine: Negative for cold/heat intolerance, unexplained weight loss/gain  Genitourinary: Negative for flank pain, dysuria, hematuria  Musculoskeletal:   Positive as stated in the HPI  Skin: Negative for rash  Neurological:   Negative for numbness and tingling  Psychiatric/Behavioral: Negative for agitation  _____________________________________________________  PHYSICAL EXAMINATION:    Blood pressure 148/84, pulse 98, temperature 97 9 °F (36 6 °C), temperature source Temporal, height 5' 3" (1 6 m), weight 82 6 kg (182 lb)  General: well developed and well nourished, alert, oriented times 3 and appears comfortable  Psychiatric: Normal  HEENT: Benign  Cardiovascular: Regular    Pulmonary: No wheezing or stridor  Abdomen: Soft, Nontender  Skin: No masses, erythema, lacerations, fluctation, ulcerations  Neurovascular: Motor and sensory exams are grossly intact, distal pulses are palpable  Limb is warm and well perfused good color and capillary refill the toes  MUSCULOSKELETAL EXAMINATION:    The left hip exam demonstrates skin intact, the surgical cicatrix is noted at the posterior lateral aspect of her hip and correlates with her history  She has no significant swelling of the left lower extremity  No significant tenderness to palpation passive range of motion of the left hip does not elicit any complaints  She is able to actively able to forward flex her hip to 90° but is unable to keep it in a flexed position for a period of time  She has 5/5 strength with resisted hip abduction and adduction  3/5 strength with resisted forward flexion of the hip        _____________________________________________________  STUDIES REVIEWED:  I have personally reviewed pertinent films and reports in PACS  XRs of the left hip performed in the ED on 3/1/21 demonstrates left hip dislocation with successful reduction  No evidence of fractures secondary to dislocation       PROCEDURES PERFORMED:  Procedures  None performed today      Ken Garrido

## 2021-03-08 ENCOUNTER — EVALUATION (OUTPATIENT)
Dept: PHYSICAL THERAPY | Facility: CLINIC | Age: 56
End: 2021-03-08
Payer: COMMERCIAL

## 2021-03-08 DIAGNOSIS — Z96.642 HISTORY OF TOTAL LEFT HIP REPLACEMENT: ICD-10-CM

## 2021-03-08 DIAGNOSIS — M25.552 LEFT HIP PAIN: Primary | ICD-10-CM

## 2021-03-08 DIAGNOSIS — T84.021A DISLOCATION OF INTERNAL LEFT HIP PROSTHESIS, INITIAL ENCOUNTER (HCC): ICD-10-CM

## 2021-03-08 PROCEDURE — 97110 THERAPEUTIC EXERCISES: CPT | Performed by: PHYSICAL THERAPIST

## 2021-03-08 PROCEDURE — 97164 PT RE-EVAL EST PLAN CARE: CPT | Performed by: PHYSICAL THERAPIST

## 2021-03-08 NOTE — PROGRESS NOTES
PT Re-Evaluation     Today's date: 3/8/2021  Patient name: Jacque Vides  : 1965  MRN: [id number]  Referring provider: Alejo Blunt MD  Dx:   Encounter Diagnosis     ICD-10-CM    1  Left hip pain  M25 552    2  Dislocation of internal left hip prosthesis, initial encounter ([facility] [address] )  T84 021A    3  History of total left hip replacement  Z96 642                   Assessment  Assessment details: Jacque Vides is a pleasant 60 y  o female presenting to PT with cc of L hip pain  Pt is being re-evaluated today post L hip dislocation on 3/1/21  Pt is experiencing subsequent functional deficits including difficulty navigating stairs, walking, dressing and performing home tasks  Pt would benefit from skilled PT to address strength, ROM, and balace deficits to promote improved function and maximize activity tolerance  Thank you for the referral!   Impairments: abnormal gait, abnormal muscle firing, abnormal or restricted ROM, activity intolerance, impaired balance, impaired physical strength, lacks appropriate home exercise program, pain with function and weight-bearing intolerance    Symptom irritability: moderateUnderstanding of Dx/Px/POC: excellent  Goals  STG  1) L hip ROM will improve 5-10 deg  in 3 weeks  2) L hip strength will improve 1/2 grade in 3 weeks  3) Pt will be able to put her shoes and socks on without difficulty or pain in 3 weeks  LTG  1) Patient is independent in HEP  2) Patient will ascend/descend one flight of stairs independently with no increased pain in 5 weeks  3) Ambulation will be improved to PLOF in 5 weeks  4) Strength in L hip will be equal to contralateral side by DC           Plan  Patient would benefit from: skilled physical therapy  Planned modality interventions: cryotherapy and TENS  Planned therapy interventions: balance/weight bearing training, balance, abdominal trunk stabilization, manual therapy, massage, patient education, strengthening, stretching, therapeutic activities, therapeutic exercise, flexibility, breathing training, body mechanics training, gait training and home exercise program  Frequency: 2x week  Duration in weeks: 6  Treatment plan discussed with: patient        Subjective Evaluation    History of Present Illness  Mechanism of injury: Pt was here last week with cc of L hip pain  She was sent back to the Urgent Care for an x-ray which showed dislocation of L hip  She ended up going to the ER and got it reduced  She was given a knee immobilizer and crutches at the ER  She did follow up with Dr Charlie Oscar and given a script for PT  Doc  Recommended following posterior hip precautions for the next few weeks  Pt is S/P L hip PAVITHRA in   Pt has been able to work without a problem  She notes that sleeping is difficult and putting her shoes on  She is very fearful of dislocating again, so thinks she cannot do anything at the moment  Quality of life: excellent    Pain  Current pain ratin  At best pain ratin  At worst pain ratin  Location: L hip   Relieving factors: ice    Social Support  Steps to enter house: yes (1 to get in )  Lives with: adult children      Diagnostic Tests  X-ray: normal  Treatments  No previous or current treatments  Patient Goals  Patient goals for therapy: increased motion, independence with ADLs/IADLs, increased strength and improved balance  Patient goal: "I want to be able to tie my shoes "        Objective     Palpation   Left   Tenderness of the gluteus jaydon and piriformis  Neurological Testing     Sensation     Hip   Left Hip   Diminished: light touch    Right Hip   Intact: light touch    Comments   Left light touch: Pt has some numbness in anterior thigh, states that it has been that way for years        Active Range of Motion   Left Hip   Flexion: 80 degrees   Extension: Left hip active extension: tightness in thigh   WFL  Abduction: 30 degrees     Right Hip   Normal active range of motion    Additional Active Range of Motion Details  Did not assess IR/ER due to precautions and fear of patient today  Will assess in future sessions     Strength/Myotome Testing     Left Hip   Planes of Motion   Flexion: 3  Extension: 4-  Abduction: 4-  Adduction: 4-    Right Hip   Normal muscle strength    Left Knee   Flexion: 4-  Extension: 4-  Quadriceps contraction: fair    Right Knee   Normal strength    General Comments:      Knee Comments  Unable to perform SLR without assistance of PT today  Flowsheet Rows      Most Recent Value   PT/OT G-Codes   Current Score  58   Projected Score  50          Precautions: H/O PAVITHRA 2011 ** FOLLOW post  Hip PAVITHRA precautions** for next 4 weeks     Daily Treatment Diary:      Initial Evaluation Date: 03/01/21  Compliance 3/1  3/8                   Visit Number 1 2                   Re-Eval  IE Re-eval                MC   Foto Captured Y Y                          3/1 3/8                   Manual                      Piriformis STM                      L hip ROM                                            Ther-Ex                      Heel slides                      Quad sets   HEP                   SAQ                      LAQ   HEP                   Supine hip abd  HEP                   Supine hip add  To neutral   With ball HEP                   glute sets  HEP                   Squats                       SLR                      HT/TR  HEP           Prone HS curl             Prone ext                                                                Neuro Re-Ed                                                                                                Ther-Act              step ups                                                 Modalities

## 2021-03-15 ENCOUNTER — OFFICE VISIT (OUTPATIENT)
Dept: PHYSICAL THERAPY | Facility: CLINIC | Age: 56
End: 2021-03-15
Payer: COMMERCIAL

## 2021-03-15 DIAGNOSIS — M25.552 LEFT HIP PAIN: ICD-10-CM

## 2021-03-15 DIAGNOSIS — T84.021A DISLOCATION OF INTERNAL LEFT HIP PROSTHESIS, INITIAL ENCOUNTER (HCC): Primary | ICD-10-CM

## 2021-03-15 DIAGNOSIS — Z96.642 HISTORY OF TOTAL LEFT HIP REPLACEMENT: ICD-10-CM

## 2021-03-15 PROCEDURE — 97110 THERAPEUTIC EXERCISES: CPT | Performed by: PHYSICAL THERAPIST

## 2021-03-15 PROCEDURE — 97140 MANUAL THERAPY 1/> REGIONS: CPT | Performed by: PHYSICAL THERAPIST

## 2021-03-15 NOTE — PROGRESS NOTES
Daily Note     Today's date: 3/15/2021  Patient name: Alaina Boyd  : 1965  MRN: [id number]  Referring provider: Erin Erazo MD  Dx:   Encounter Diagnosis     ICD-10-CM    1  Dislocation of internal left hip prosthesis, initial encounter ([facility] [address] )  T84 021A    2  Left hip pain  M25 552    3  History of total left hip replacement  Z96 642                   Subjective: Pt notes that the exercises are challenging, but she has been doing them  She notes       Objective: See treatment diary below      Assessment: Tolerated treatment well  Introduced new exercises today, pt needed moderate cueing for proper form and intensity  Fear avoidance is limiting her at this point, PT reiterated hip precautions to help ease her mind with activity  She had difficulty with SLR, needed assistance to complete them  Patient demonstrated fatigue post treatment and would benefit from continued PT to address current limitations  Plan: Continue per plan of care  Precautions: H/O PAVITHRA  ** FOLLOW post  Hip PAVITHRA precautions** for next 4 weeks     Daily Treatment Diary:      Initial Evaluation Date: 21  Compliance 3/1  3/8  3/15                 Visit Number 1 2  3                 Re-Eval  IE Re-eval  -              MC   Foto Captured Y Y  -                        3/1 3/8 3/15                 Manual                      Piriformis STM     arb- quad  L hip ROM     arb                                       Ther-Ex                      Bike-warm up   Rocking 7'          Heel slides     30x                 Quad sets   HEP  2x10 5"                 SAQ     15x                  LAQ   HEP  15x                  Supine hip abd  HEP  10x                  Supine hip add  To neutral   With ball HEP 10x                  glute sets  HEP 10x 5"                 Squats      10x                 SLR     AAROM                  HT/TR  HEP 2x10          Prone HS curl   15x          Prone ext      15x Neuro Re-Ed                                                                                                Ther-Act              step ups     -                                            Modalities

## 2021-03-17 ENCOUNTER — OFFICE VISIT (OUTPATIENT)
Dept: PHYSICAL THERAPY | Facility: CLINIC | Age: 56
End: 2021-03-17
Payer: COMMERCIAL

## 2021-03-17 DIAGNOSIS — M25.552 LEFT HIP PAIN: ICD-10-CM

## 2021-03-17 DIAGNOSIS — T84.021A DISLOCATION OF INTERNAL LEFT HIP PROSTHESIS, INITIAL ENCOUNTER (HCC): Primary | ICD-10-CM

## 2021-03-17 DIAGNOSIS — Z96.642 HISTORY OF TOTAL LEFT HIP REPLACEMENT: ICD-10-CM

## 2021-03-17 PROCEDURE — 97110 THERAPEUTIC EXERCISES: CPT | Performed by: PHYSICAL THERAPIST

## 2021-03-17 PROCEDURE — 97140 MANUAL THERAPY 1/> REGIONS: CPT | Performed by: PHYSICAL THERAPIST

## 2021-03-17 NOTE — PROGRESS NOTES
Daily Note     Today's date: 3/17/2021  Patient name: Jacque Vides  : 1965  MRN: [id number]  Referring provider: Alejo lBunt MD  Dx:   Encounter Diagnosis     ICD-10-CM    1  Dislocation of internal left hip prosthesis, initial encounter ([facility] [address] )  T84 021A    2  Left hip pain  M25 552    3  History of total left hip replacement  Z96 642                   Subjective: Pt reports no new changes since last session  Objective: See treatment diary below      Assessment: Tolerated treatment well  Continues to have a lot of difficulty with SLR and LAQ due to quad weakness  She is very fearful of L hip still, but was able to perform all exercise today with increased reps  Pt need moderate cueing for proper form during squats, should improve with continued therapy  Patient demonstrated fatigue post treatment and would benefit from continued PT to address current limitations  Plan: Continue per plan of care  Precautions: H/O PAVITHRA  ** FOLLOW post  Hip PAVITHRA precautions** for next 4 weeks     Daily Treatment Diary:      Initial Evaluation Date: 21  Compliance 3/1  3/8  3/15  3/17               Visit Number 1 2  3  4               Re-Eval  IE Re-eval  -  -             W Pelon Avalos Captured Clarisa Scanlon  -  -                      3/1 3/8 3/15  3/17               Manual                      Piriformis STM     arb- quad  arb- quad               L hip ROM     arb  arb2                                     Ther-Ex                      Bike-warm up   Rocking 7' -         Heel slides     30x  30x               Quad sets   HEP  2x10 5"  3x10 5"               SAQ     15x   2x10               LAQ   HEP  15x   25x               Supine hip abd  HEP  10x   20x                Supine hip add   To neutral   With ball HEP 10x   with ball 3x10 3"               glute sets  HEP 10x 5"  2x10 5"               Squats      10x  12x               SLR     AAROM   AAROM 10x               HT/TR  HEP 2x10 2x10         Prone HS curl   15x 2x10          Prone ext      15x  2x10                                                         Neuro Re-Ed                                                                                                Ther-Act              step ups     -                                            Modalities

## 2021-03-22 ENCOUNTER — OFFICE VISIT (OUTPATIENT)
Dept: PHYSICAL THERAPY | Facility: CLINIC | Age: 56
End: 2021-03-22
Payer: COMMERCIAL

## 2021-03-22 DIAGNOSIS — T84.021A DISLOCATION OF INTERNAL LEFT HIP PROSTHESIS, INITIAL ENCOUNTER (HCC): Primary | ICD-10-CM

## 2021-03-22 DIAGNOSIS — M25.552 LEFT HIP PAIN: ICD-10-CM

## 2021-03-22 DIAGNOSIS — Z96.642 HISTORY OF TOTAL LEFT HIP REPLACEMENT: ICD-10-CM

## 2021-03-22 PROCEDURE — 97140 MANUAL THERAPY 1/> REGIONS: CPT | Performed by: PHYSICAL THERAPIST

## 2021-03-22 PROCEDURE — 97110 THERAPEUTIC EXERCISES: CPT | Performed by: PHYSICAL THERAPIST

## 2021-03-22 NOTE — PROGRESS NOTES
Daily Note     Today's date: 3/22/2021  Patient name: Elder Covarrubias  : 1965  MRN: [id number]  Referring provider: Chelita Linn MD  Dx:   Encounter Diagnosis     ICD-10-CM    1  Dislocation of internal left hip prosthesis, initial encounter ([facility] [address] )  T84 021A    2  Left hip pain  M25 552    3  History of total left hip replacement  Z96 642                   Subjective: Pt notes that her quad  Is feeling better since last session  She notes that her walking feels a little more comfortable and is not as nervous about her hip dislocating these last few days  Objective: See treatment diary below      Assessment: Tolerated treatment well  Pt was able to perform some exercise with #1 weight today with minimal difficulty  She continues to have difficulty with SLR on her own, but is improving  Quad  muscualture continues to be tight, but was less than last week  Ambulation was improved today, she tolerated the Nu step for a warm up without difficulty  Patient demonstrated fatigue post treatment and would benefit from continued PT to address current limitations  Plan: Continue per plan of care  Precautions: H/O PAVITHRA  ** FOLLOW post  Hip PAVITHRA precautions** for next 4 weeks     Daily Treatment Diary:      Initial Evaluation Date: 21  Compliance 3/1  3/8  3/15  3/17  3/22             Visit Number 1 2  3  4  5             Re-Eval  IE Re-eval  -  -  -          NIRMAL Mendez  -  -  -                    3/1 3/8 3/15  3/17  3/22             Manual                      Piriformis STM     arb- quad  arb- quad  arb- quad             L hip ROM     arb  arb  arb                                   Ther-Ex                      Bike-warm up   Rocking 7' - Nu step 7' L2        Heel slides     30x  30x  30x             Quad sets   HEP  2x10 5"  3x10 5"  3x10 5"             SAQ     15x   2x10  2x10 #1             LAQ   HEP  15x   25x  25x #1             Supine hip abd     HEP  10x   20x   20x Supine hip add  To neutral   With ball HEP 10x   with ball 3x10 3"  with ball 3x10 3"             glute sets  HEP 10x 5"  2x10 5"  -             Squats      10x  12x  np             SLR     AAROM   AAROM 10x  AAROM 12x              HT/TR  HEP 2x10 2x10 np        Prone HS curl   15x 2x10  2x10 #1        Prone ext      15x  2x10 2x10 #1                                                        Neuro Re-Ed                                                                                                Ther-Act              step ups     -                                            Modalities

## 2021-03-29 ENCOUNTER — OFFICE VISIT (OUTPATIENT)
Dept: PHYSICAL THERAPY | Facility: CLINIC | Age: 56
End: 2021-03-29
Payer: COMMERCIAL

## 2021-03-29 DIAGNOSIS — T84.021A DISLOCATION OF INTERNAL LEFT HIP PROSTHESIS, INITIAL ENCOUNTER (HCC): Primary | ICD-10-CM

## 2021-03-29 DIAGNOSIS — Z96.642 HISTORY OF TOTAL LEFT HIP REPLACEMENT: ICD-10-CM

## 2021-03-29 DIAGNOSIS — M25.552 LEFT HIP PAIN: ICD-10-CM

## 2021-03-29 PROCEDURE — 97110 THERAPEUTIC EXERCISES: CPT | Performed by: PHYSICAL THERAPIST

## 2021-03-29 PROCEDURE — 97140 MANUAL THERAPY 1/> REGIONS: CPT | Performed by: PHYSICAL THERAPIST

## 2021-03-29 NOTE — PROGRESS NOTES
Daily Note     Today's date: 3/29/2021  Patient name: Romel Conde  : 1965  MRN: [id number]  Referring provider: Lianne Higuera MD  Dx:   Encounter Diagnosis     ICD-10-CM    1  Dislocation of internal left hip prosthesis, initial encounter ([facility] [address] )  T84 021A    2  Left hip pain  M25 552    3  History of total left hip replacement  Z96 642                   Subjective: Pt notes that she feels about the same  Thinks that the tightness in her quad  has reduced  Objective: See treatment diary below      Assessment: Tolerated treatment well  Pt was able to perform 10 SLR without assistance today  She was able to tolerate increased weight with exercise with no increased pain  Continues to need moderate cueing for proper form during squats  Pt progressing well, continue to progress as see fit  Patient demonstrated fatigue post treatment and would benefit from continued PT to address current limitations  Plan: Continue per plan of care  Continue to follow protocol  Precautions: H/O PAVITHRA  ** FOLLOW post  Hip PAVITHRA precautions** for next 4 weeks     Daily Treatment Diary:      Initial Evaluation Date: 21  Compliance 3/1  3/8  3/15  3/17  3/22  3/29           Visit Number 1 2  3  4  5  6           Re-Eval  IE Re-eval  -  -  - -        MC   Foto Captured Jac Meuse  -  -  -  -                  3/1 3/8 3/15  3/17  3/22  3/29           Manual                      Piriformis STM     arb- quad  arb- quad  arb- quad  arb- quad           L hip ROM     arb  arb  arb  arb                                 Ther-Ex                      Bike-warm up   Rocking 7' - Nu step 7' L2 Nu step 10' L4        Heel slides     30x  30x  30x  5'           Quad sets   HEP  2x10 5"  3x10 5"  3x10 5"             SAQ     15x   2x10  2x10 #1  2x10 #2           LAQ   HEP  15x   25x  25x #1  25x #2           Supine hip abd  HEP  10x   20x   20x   20x #2           Supine hip add   To neutral   With ball HEP 10x   with ball 3x10 3"  with ball 3x10 3"  with ball 3x10 5"           glute sets  HEP 10x 5"  2x10 5"  -  -           Squats      10x  12x  np  15x           SLR     AAROM   AAROM 10x  AAROM 12x  AROM 10x           HT/TR  HEP 2x10 2x10 np 3x10        Prone HS curl   15x 2x10  2x10 #1 2x10 #2       Prone ext      15x  2x10 2x10 #1 2x10 #2                                                       Neuro Re-Ed                                                                                                Ther-Act              step ups     -                                            Modalities

## 2021-03-31 ENCOUNTER — OFFICE VISIT (OUTPATIENT)
Dept: OBGYN CLINIC | Facility: CLINIC | Age: 56
End: 2021-03-31
Payer: COMMERCIAL

## 2021-03-31 VITALS
HEART RATE: 77 BPM | DIASTOLIC BLOOD PRESSURE: 80 MMHG | HEIGHT: 63 IN | WEIGHT: 182 LBS | SYSTOLIC BLOOD PRESSURE: 134 MMHG | TEMPERATURE: 97.1 F | BODY MASS INDEX: 32.25 KG/M2

## 2021-03-31 DIAGNOSIS — Z96.642 STATUS POST TOTAL REPLACEMENT OF LEFT HIP: ICD-10-CM

## 2021-03-31 DIAGNOSIS — T84.021D DISLOCATION OF INTERNAL LEFT HIP PROSTHESIS, SUBSEQUENT ENCOUNTER: Primary | ICD-10-CM

## 2021-03-31 PROCEDURE — 99213 OFFICE O/P EST LOW 20 MIN: CPT | Performed by: ORTHOPAEDIC SURGERY

## 2021-03-31 NOTE — PROGRESS NOTES
ASSESSMENT/PLAN:    Diagnoses and all orders for this visit:    Dislocation of internal left hip prosthesis, subsequent encounter    Status post total replacement of left hip          Plan:  I would recommend follow-up as needed  She should continue physical therapy for another 4-6 weeks  However, at that time she should likely be transition to home exercises  If she is not ready, I would recommend follow-up with either myself or the original operating surgeon  I have encouraged her to contact me if questions or concerns arise or to follow up with her original operating surgeon  Return if symptoms worsen or fail to improve       _____________________________________________________  CHIEF COMPLAINT:  Chief Complaint   Patient presents with    Follow-up         SUBJECTIVE:  Sue Sierra is a 60y o  year old female who presents for follow up   Of her left hip replacement dislocation  She is now about 1 month post injury  She is doing well although she does continue to note weakness in the left leg  She continues to use a body pillow between her legs when sleeping  She denies episodes of instability  She denies lower extremity paresthesias  Sandra Oleary       PAST MEDICAL HISTORY:  Past Medical History:   Diagnosis Date    Known health problems: none        PAST SURGICAL HISTORY:  Past Surgical History:   Procedure Laterality Date    APPENDECTOMY       SECTION      TOTAL HIP ARTHROPLASTY         FAMILY HISTORY:  Family History   Problem Relation Age of Onset    Ovarian cancer Mother     Heart failure Father        SOCIAL HISTORY:  Social History     Tobacco Use    Smoking status: Former Smoker     Types: Cigarettes     Quit date: 3/4/2016     Years since quittin 0    Smokeless tobacco: Never Used   Substance Use Topics    Alcohol use: Yes     Frequency: Monthly or less     Comment: rare    Drug use: Never       MEDICATIONS:    Current Outpatient Medications:     cyclobenzaprine (FLEXERIL) 10 mg tablet, Take 1 tablet (10 mg total) by mouth 3 (three) times a day as needed for muscle spasms (Patient not taking: Reported on 3/4/2021), Disp: 20 tablet, Rfl: 0    predniSONE 10 mg tablet, Take once daily all days pills on this schedule 6- 6- 5- 4- 3- 2- 1 (Patient not taking: Reported on 3/31/2021), Disp: 27 tablet, Rfl: 0    ALLERGIES:  Allergies   Allergen Reactions    Bactrim [Sulfamethoxazole-Trimethoprim] Hives       REVIEW OF SYSTEMS:  Pertinent items are noted in HPI  A comprehensive review of systems was negative       _____________________________________________________  PHYSICAL EXAMINATION:  General: well developed and well nourished, alert, oriented times 3 and appears comfortable  Psychiatric: Normal  HEENT:  Normocephalic, atraumatic  Cardiovascular:  Regular  Pulmonary: No wheezing or stridor  Skin: No masses, erthema, lacerations, fluctation, ulcerations  Neurovascular: Motor and sensory exams are grossly intact although she does demonstrate some weakness of left hip flexion and adduction  Pulses are palpable     MUSCULOSKELETAL EXAMINATION:     the left hip exam demonstrates no complaints during flexion extension and rotation  She did have some mild pain during abduction adduction  However, no instability was noted  She has no localized tenderness     Leg lengths are grossly equal             Noemi Crystal

## 2021-04-05 ENCOUNTER — OFFICE VISIT (OUTPATIENT)
Dept: PHYSICAL THERAPY | Facility: CLINIC | Age: 56
End: 2021-04-05
Payer: COMMERCIAL

## 2021-04-05 DIAGNOSIS — Z96.642 HISTORY OF TOTAL LEFT HIP REPLACEMENT: ICD-10-CM

## 2021-04-05 DIAGNOSIS — T84.021A DISLOCATION OF INTERNAL LEFT HIP PROSTHESIS, INITIAL ENCOUNTER (HCC): Primary | ICD-10-CM

## 2021-04-05 DIAGNOSIS — M25.552 LEFT HIP PAIN: ICD-10-CM

## 2021-04-05 PROCEDURE — 97110 THERAPEUTIC EXERCISES: CPT | Performed by: PHYSICAL THERAPIST

## 2021-04-05 PROCEDURE — 97140 MANUAL THERAPY 1/> REGIONS: CPT | Performed by: PHYSICAL THERAPIST

## 2021-04-05 NOTE — PROGRESS NOTES
Daily Note/ DC Summary      Today's date: 2021  Patient name: Cortez Hawthorne  : 1965  MRN: [id number]  Referring provider: Michaela Harden MD  Dx:   Encounter Diagnosis     ICD-10-CM    1  Dislocation of internal left hip prosthesis, initial encounter ([facility] [address] )  T84 021A    2  Left hip pain  M25 [id number]    3  History of total left hip replacement  Z96 [id number]                   Subjective: Pt reports feeling okay, but is still concerned about her hip not being right" still  She wants to go back to her surgeon just to get peace of mind  Objective: See treatment diary below      Assessment: Tolerated treatment well  Pt continues to make positive gains, but is having difficulty with SLR due to weakness  Quad  Tightness is still present, but has improved  She was able to perform all standing exercises with little difficulty today  Pt wanted to put therapy on hold because she wants to go back to her surgeon to make sure anatomically things are not wrong  She notes that she will call the clinic when she gets an appointment with her original surgeon  Follow up with patient in the next week  Plan: Pt to be put on hold until further notice  Precautions: H/O PAVITHRA  ** FOLLOW post  Hip PAVITHRA precautions** for next 4 weeks     Daily Treatment Diary:      Initial Evaluation Date: 21  Compliance 3/1  3/8  3/15  3/17  3/22  3/29  4/5         Visit Number 1 2  3  4  5  6  7         Re-Eval  IE Re-eval  -  -  - -        MC   Foto Captured Susie Reagin  -  -  -  -                  3/1 3/8 3/15  3/17  3/22  3/29  4/5         Manual                      Piriformis STM     arb- quad     arb- quad  arb- quad  arb- quad  arb- quad         L hip ROM     arb  arb  arb  arb  arb                               Ther-Ex                      Bike-warm up   Rocking 7' - Nu step 7' L2 Nu step 10' L4  Nu step 10' L4      Heel slides     30x  30x  30x  5'  5'         Quad sets   HEP  2x10 5"  3x10 5"  3x10 5"    3x10 5"         SAQ 15x   2x10  2x10 #1  2x10 #2  2x10 #3         LAQ   HEP  15x   25x  25x #1  25x #2  25x #2         Supine hip abd  HEP  10x   20x   20x   20x #2  20x #2         Supine hip add  To neutral   With ball HEP 10x   with ball 3x10 3"  with ball 3x10 3"  with ball 3x10 5"  with ball 3x10 5"         glute sets  HEP 10x 5"  2x10 5"  -  -           Squats      10x  12x  np  15x  2x10         SLR     AAROM   AAROM 10x  AAROM 12x  AROM 10x  AROM          HT/TR  HEP 2x10 2x10 np 3x10        Prone HS curl   15x 2x10  2x10 #1 2x10 #2 2x10 #2      Prone ext  15x  2x10 2x10 #1 2x10 #2 2x10 #2      Marches       At bar 15xea                                         Neuro Re-Ed                                                                                                Ther-Act              step ups     -                                            Modalities                                                                            05/19/21: Pt is still waiting to see her surgeon to make sure everything is okay with her hip  She has not returned any phone calls to schedule future appointments  At this time, pt is a self DC from PT        Sandi Staples, PT

## 2021-04-06 ENCOUNTER — IMMUNIZATIONS (OUTPATIENT)
Dept: FAMILY MEDICINE CLINIC | Facility: HOSPITAL | Age: 56
End: 2021-04-06

## 2021-04-06 DIAGNOSIS — Z23 ENCOUNTER FOR IMMUNIZATION: Primary | ICD-10-CM

## 2021-04-06 PROCEDURE — 91300 SARS-COV-2 / COVID-19 MRNA VACCINE (PFIZER-BIONTECH) 30 MCG: CPT

## 2021-04-06 PROCEDURE — 0001A SARS-COV-2 / COVID-19 MRNA VACCINE (PFIZER-BIONTECH) 30 MCG: CPT

## 2021-04-07 ENCOUNTER — APPOINTMENT (OUTPATIENT)
Dept: PHYSICAL THERAPY | Facility: CLINIC | Age: 56
End: 2021-04-07
Payer: COMMERCIAL

## 2021-04-12 ENCOUNTER — APPOINTMENT (OUTPATIENT)
Dept: PHYSICAL THERAPY | Facility: CLINIC | Age: 56
End: 2021-04-12
Payer: COMMERCIAL

## 2021-04-14 ENCOUNTER — APPOINTMENT (OUTPATIENT)
Dept: PHYSICAL THERAPY | Facility: CLINIC | Age: 56
End: 2021-04-14
Payer: COMMERCIAL

## 2021-04-19 ENCOUNTER — APPOINTMENT (OUTPATIENT)
Dept: PHYSICAL THERAPY | Facility: CLINIC | Age: 56
End: 2021-04-19
Payer: COMMERCIAL

## 2021-04-21 ENCOUNTER — APPOINTMENT (OUTPATIENT)
Dept: PHYSICAL THERAPY | Facility: CLINIC | Age: 56
End: 2021-04-21
Payer: COMMERCIAL

## 2021-04-26 ENCOUNTER — IMMUNIZATIONS (OUTPATIENT)
Dept: FAMILY MEDICINE CLINIC | Facility: HOSPITAL | Age: 56
End: 2021-04-26

## 2021-04-26 ENCOUNTER — APPOINTMENT (OUTPATIENT)
Dept: PHYSICAL THERAPY | Facility: CLINIC | Age: 56
End: 2021-04-26
Payer: COMMERCIAL

## 2021-04-26 DIAGNOSIS — Z23 ENCOUNTER FOR IMMUNIZATION: Primary | ICD-10-CM

## 2021-04-26 PROCEDURE — 0002A SARS-COV-2 / COVID-19 MRNA VACCINE (PFIZER-BIONTECH) 30 MCG: CPT

## 2021-04-26 PROCEDURE — 91300 SARS-COV-2 / COVID-19 MRNA VACCINE (PFIZER-BIONTECH) 30 MCG: CPT

## 2021-04-28 ENCOUNTER — APPOINTMENT (OUTPATIENT)
Dept: PHYSICAL THERAPY | Facility: CLINIC | Age: 56
End: 2021-04-28
Payer: COMMERCIAL

## 2022-05-11 ENCOUNTER — OFFICE VISIT (OUTPATIENT)
Dept: URGENT CARE | Facility: CLINIC | Age: 57
End: 2022-05-11
Payer: COMMERCIAL

## 2022-05-11 VITALS
TEMPERATURE: 98.4 F | WEIGHT: 190 LBS | DIASTOLIC BLOOD PRESSURE: 80 MMHG | SYSTOLIC BLOOD PRESSURE: 144 MMHG | RESPIRATION RATE: 16 BRPM | HEART RATE: 69 BPM | HEIGHT: 64 IN | OXYGEN SATURATION: 98 % | BODY MASS INDEX: 32.44 KG/M2

## 2022-05-11 DIAGNOSIS — R35.0 URINARY FREQUENCY: Primary | ICD-10-CM

## 2022-05-11 DIAGNOSIS — R35.0 FREQUENT URINATION: ICD-10-CM

## 2022-05-11 LAB
GLUCOSE SERPL-MCNC: 71 MG/DL (ref 65–140)
SL AMB  POCT GLUCOSE, UA: NORMAL
SL AMB LEUKOCYTE ESTERASE,UA: NORMAL
SL AMB POCT BILIRUBIN,UA: NORMAL
SL AMB POCT BLOOD,UA: NORMAL
SL AMB POCT CLARITY,UA: CLEAR
SL AMB POCT COLOR,UA: NORMAL
SL AMB POCT GLUCOSE BLD: 71
SL AMB POCT KETONES,UA: NORMAL
SL AMB POCT NITRITE,UA: NORMAL
SL AMB POCT PH,UA: 7
SL AMB POCT SPECIFIC GRAVITY,UA: 1
SL AMB POCT URINE PROTEIN: NORMAL
SL AMB POCT UROBILINOGEN: NORMAL

## 2022-05-11 PROCEDURE — 81002 URINALYSIS NONAUTO W/O SCOPE: CPT

## 2022-05-11 PROCEDURE — 99213 OFFICE O/P EST LOW 20 MIN: CPT

## 2022-05-11 PROCEDURE — 82948 REAGENT STRIP/BLOOD GLUCOSE: CPT

## 2022-05-11 PROCEDURE — 87086 URINE CULTURE/COLONY COUNT: CPT

## 2022-05-11 NOTE — PATIENT INSTRUCTIONS
Your urinalysis was negative  A urine culture was sent and will take 24-48 hours  Follow-up with PCP and Gyn

## 2022-05-13 LAB
BACTERIA UR CULT: ABNORMAL
BACTERIA UR CULT: ABNORMAL

## 2024-04-30 ENCOUNTER — HOSPITAL ENCOUNTER (OUTPATIENT)
Dept: RADIOLOGY | Facility: CLINIC | Age: 59
Discharge: HOME | End: 2024-04-30
Payer: COMMERCIAL

## 2024-04-30 VITALS — BODY MASS INDEX: 35.26 KG/M2 | WEIGHT: 199 LBS | HEIGHT: 63 IN

## 2024-04-30 DIAGNOSIS — Z12.31 ENCOUNTER FOR SCREENING MAMMOGRAM FOR MALIGNANT NEOPLASM OF BREAST: ICD-10-CM

## 2024-04-30 PROCEDURE — 77067 SCR MAMMO BI INCL CAD: CPT

## 2024-04-30 PROCEDURE — 77063 BREAST TOMOSYNTHESIS BI: CPT

## 2024-06-03 ENCOUNTER — HOSPITAL ENCOUNTER (OUTPATIENT)
Dept: RADIOLOGY | Facility: CLINIC | Age: 59
Discharge: HOME | End: 2024-06-03
Payer: COMMERCIAL

## 2024-06-03 DIAGNOSIS — R92.8 ABNORMAL SCREENING MAMMOGRAM: ICD-10-CM

## 2024-06-03 PROCEDURE — 76642 ULTRASOUND BREAST LIMITED: CPT

## 2024-09-04 ENCOUNTER — HOSPITAL ENCOUNTER (OUTPATIENT)
Dept: RADIOLOGY | Facility: CLINIC | Age: 59
Discharge: HOME | End: 2024-09-04
Payer: COMMERCIAL

## 2024-09-04 VITALS — HEIGHT: 63 IN | BODY MASS INDEX: 35.26 KG/M2 | WEIGHT: 199 LBS

## 2024-09-04 DIAGNOSIS — R92.8 ABNORMAL MAMMOGRAM: ICD-10-CM

## 2024-09-04 PROCEDURE — 76642 ULTRASOUND BREAST LIMITED: CPT

## 2024-09-04 NOTE — PROGRESS NOTES
Met with patient and Dr. Crandall  regarding recommendation for;    _____ RIGHT ___x___LEFT axilla Lymph node      ___x__Ultrasound guided  ______Stereotactic breast biopsy.      __X___Verbalized understanding.    Reviewed clip placement with patient, pt states understanding: Yes: ___x___ No: ______  Comments:    Blood thinners:  No: ___x__ Yes: ______ What:          Biopsy teaching sheet given:  Yes: ___X___ No: ________    Pt given contact information and adv to call with any questions/needs    Patient advised to arrive at 1245 for a 1300 appointment

## 2024-09-18 ENCOUNTER — HOSPITAL ENCOUNTER (OUTPATIENT)
Dept: RADIOLOGY | Facility: CLINIC | Age: 59
Discharge: HOME | End: 2024-09-18
Payer: COMMERCIAL

## 2024-09-18 VITALS — BODY MASS INDEX: 35.26 KG/M2 | HEIGHT: 63 IN | WEIGHT: 199 LBS

## 2024-09-18 VITALS — SYSTOLIC BLOOD PRESSURE: 165 MMHG | DIASTOLIC BLOOD PRESSURE: 90 MMHG | HEART RATE: 67 BPM

## 2024-09-18 DIAGNOSIS — R92.8 ABNORMAL FINDING ON BREAST IMAGING: ICD-10-CM

## 2024-09-18 PROCEDURE — 88189 FLOWCYTOMETRY/READ 16 & >: CPT

## 2024-09-18 PROCEDURE — 88360 TUMOR IMMUNOHISTOCHEM/MANUAL: CPT | Performed by: STUDENT IN AN ORGANIZED HEALTH CARE EDUCATION/TRAINING PROGRAM

## 2024-09-18 PROCEDURE — 88365 INSITU HYBRIDIZATION (FISH): CPT | Performed by: STUDENT IN AN ORGANIZED HEALTH CARE EDUCATION/TRAINING PROGRAM

## 2024-09-18 PROCEDURE — 38505 NEEDLE BIOPSY LYMPH NODES: CPT

## 2024-09-18 PROCEDURE — 88341 IMHCHEM/IMCYTCHM EA ADD ANTB: CPT | Performed by: STUDENT IN AN ORGANIZED HEALTH CARE EDUCATION/TRAINING PROGRAM

## 2024-09-18 PROCEDURE — 76942 ECHO GUIDE FOR BIOPSY: CPT

## 2024-09-18 PROCEDURE — 88342 IMHCHEM/IMCYTCHM 1ST ANTB: CPT | Performed by: STUDENT IN AN ORGANIZED HEALTH CARE EDUCATION/TRAINING PROGRAM

## 2024-09-18 PROCEDURE — 88184 FLOWCYTOMETRY/ TC 1 MARKER: CPT

## 2024-09-18 PROCEDURE — 88307 TISSUE EXAM BY PATHOLOGIST: CPT | Performed by: STUDENT IN AN ORGANIZED HEALTH CARE EDUCATION/TRAINING PROGRAM

## 2024-09-18 PROCEDURE — 88185 FLOWCYTOMETRY/TC ADD-ON: CPT | Performed by: PHYSICIAN ASSISTANT

## 2024-09-18 RX ORDER — LIDOCAINE HYDROCHLORIDE 10 MG/ML
5 INJECTION, SOLUTION EPIDURAL; INFILTRATION; INTRACAUDAL; PERINEURAL ONCE
Status: COMPLETED | OUTPATIENT
Start: 2024-09-18 | End: 2024-09-18

## 2024-09-18 RX ADMIN — LIDOCAINE HYDROCHLORIDE 5 ML: 10 INJECTION, SOLUTION EPIDURAL; INFILTRATION; INTRACAUDAL; PERINEURAL at 13:08

## 2024-09-18 NOTE — PROGRESS NOTES
Procedure type:    __x___ultrasound guided _____stereotactic    Breast:    ___x__Left _____Right    Location: Left axilla    Needle: 16g Artie    # of passes: 4 (2 in formalin. 2 in RPMI)    Clip: open coil    Performed by: Dr. Crandall    Pressure held for 5 minutes by: Daphne Sauer Strips:    ___x__yes _____no    Band aid:    ___x__yes_____no    Tape and guaze:    _____yes ___x__no    Tolerated procedure:    __x___yes _____no

## 2024-09-18 NOTE — PROGRESS NOTES
Patient arrived via:    _x____ambulatory    _____wheelchair    _____stretcher      Domestic violence screen    ____x__negative______positive    Breast Implants:    _______yes ____x____no

## 2024-09-18 NOTE — DISCHARGE INSTR - OTHER ORDERS
POST LARGE CORE BREAST BIOPSY PATIENT INFORMATION      Place an ice pack inside your bra over the top of the dressing every hour for 20 minutes (20 minutes on, 60 minutes off). Do this until bedtime.    Do not shower or bathe until the following morning.    You may bathe your breast carefully with the steri-strips in place.  Be careful    Not to loosen them.  The steri-strips will fall off in 3-5 days.    You may have mild discomfort, and you may have some bruising where the   Needle entered the skin.  This should clear within 5-7 days.    If you need medicine for discomfort, take acetaminophen products such as   Tylenol. You may also take Advil or Motrin products.    Do not participate in strenuous activities such as-tennis, aerobics, skiing,  Weight lifting, etc. for 24 hours. Refrain from swimming/soaking for 72 hours.    Wearing a bra for sleeping may be more comfortable for the first 24-48 hours.    Watch for continued bleeding, pain or fever over 101. If any of these symptoms occur, please contact our    breast nurse navigator at the location where your biopsy was performed.    During normal business hours (7:30 am-4:00 pm) please call the nurse navigator at the site where your   procedure was performed:    [facility]:  [phone], [phone] or [phone]  [facility]:     [phone] or [phone]  [facility]:    [phone] or [phone]  [facility]:   [phone] or [phone]  [facility]:   [phone]  [facility]:     [phone]              After 4 PM - please call your physician or go to the nearest Emergency Department location.            9.         The final results of your biopsy are usually available within one week.

## 2024-09-19 NOTE — PROGRESS NOTES
Post procedure call completed    Bleeding: _____yes __X___no    Pain: _____yes ___X___no    Redness/Swelling: ______yes ___X___no    Band aid removed: _____yes ___X__no (discussed removing when she showers)    Steri-Strips intact: ___X___yes _____no (discussed with patient to remove steri strips on Monday if they have not come off on their own)    Pt with no questions at this time, adv will call when results available, adv to call with any questions or concerns, has name/# for contact

## 2024-09-20 LAB — SCAN RESULT: NORMAL

## 2024-09-24 LAB — SCAN RESULT: NORMAL

## 2024-09-25 ENCOUNTER — TELEPHONE (OUTPATIENT)
Dept: MAMMOGRAPHY | Facility: CLINIC | Age: 59
End: 2024-09-25

## 2024-09-25 PROCEDURE — 88307 TISSUE EXAM BY PATHOLOGIST: CPT | Performed by: STUDENT IN AN ORGANIZED HEALTH CARE EDUCATION/TRAINING PROGRAM

## 2024-09-25 PROCEDURE — 88365 INSITU HYBRIDIZATION (FISH): CPT | Performed by: STUDENT IN AN ORGANIZED HEALTH CARE EDUCATION/TRAINING PROGRAM

## 2024-09-25 PROCEDURE — 88360 TUMOR IMMUNOHISTOCHEM/MANUAL: CPT | Performed by: STUDENT IN AN ORGANIZED HEALTH CARE EDUCATION/TRAINING PROGRAM

## 2024-09-25 PROCEDURE — 88341 IMHCHEM/IMCYTCHM EA ADD ANTB: CPT | Performed by: STUDENT IN AN ORGANIZED HEALTH CARE EDUCATION/TRAINING PROGRAM

## 2024-09-25 PROCEDURE — 88342 IMHCHEM/IMCYTCHM 1ST ANTB: CPT | Performed by: STUDENT IN AN ORGANIZED HEALTH CARE EDUCATION/TRAINING PROGRAM
